# Patient Record
Sex: MALE | Race: WHITE
[De-identification: names, ages, dates, MRNs, and addresses within clinical notes are randomized per-mention and may not be internally consistent; named-entity substitution may affect disease eponyms.]

---

## 2020-05-15 ENCOUNTER — HOSPITAL ENCOUNTER (OUTPATIENT)
Dept: HOSPITAL 39 - GMA MATASK | Age: 55
End: 2020-05-15
Attending: FAMILY MEDICINE
Payer: COMMERCIAL

## 2020-05-15 DIAGNOSIS — I10: ICD-10-CM

## 2020-05-15 DIAGNOSIS — E03.9: Primary | ICD-10-CM

## 2020-11-13 ENCOUNTER — HOSPITAL ENCOUNTER (OUTPATIENT)
Dept: HOSPITAL 39 - GMA MATASK | Age: 55
End: 2020-11-13
Attending: FAMILY MEDICINE
Payer: COMMERCIAL

## 2020-11-13 DIAGNOSIS — E03.9: Primary | ICD-10-CM

## 2020-11-13 DIAGNOSIS — I10: ICD-10-CM

## 2020-12-07 ENCOUNTER — HOSPITAL ENCOUNTER (EMERGENCY)
Dept: HOSPITAL 39 - ER | Age: 55
Discharge: HOME | End: 2020-12-07
Payer: COMMERCIAL

## 2020-12-07 VITALS — DIASTOLIC BLOOD PRESSURE: 91 MMHG | SYSTOLIC BLOOD PRESSURE: 140 MMHG

## 2020-12-07 VITALS — OXYGEN SATURATION: 97 % | TEMPERATURE: 97.6 F

## 2020-12-07 DIAGNOSIS — I10: ICD-10-CM

## 2020-12-07 DIAGNOSIS — E07.9: ICD-10-CM

## 2020-12-07 DIAGNOSIS — Z87.891: ICD-10-CM

## 2020-12-07 DIAGNOSIS — M10.071: Primary | ICD-10-CM

## 2020-12-07 DIAGNOSIS — Z79.899: ICD-10-CM

## 2020-12-07 NOTE — RAD
EXAM DESCRIPTION: 



Foot,Right 3 Views



CLINICAL HISTORY: 



GREAT TOE SWELLING



COMPARISON: 



None.



TECHNIQUE: 



3 views right



FINDINGS: 



A plantar calcaneal spur is observed. No fracturing is detected.

No bone erosion or destruction is seen. Minimal degenerative

changes observed in the metatarsal phalangeal joint of the first

digit.



IMPRESSION: 



Mild degenerative changes are observed. No fracturing is

detected.



Electronically signed by:  Zachery Paz MD  12/7/2020 11:38 AM

[facility] Workstation: [id number]

## 2021-01-28 ENCOUNTER — HOSPITAL ENCOUNTER (OUTPATIENT)
Dept: HOSPITAL 39 - US | Age: 56
End: 2021-01-28
Attending: FAMILY MEDICINE
Payer: COMMERCIAL

## 2021-01-28 DIAGNOSIS — K42.9: Primary | ICD-10-CM

## 2021-01-28 NOTE — US
EXAM DESCRIPTION: 

Soft Tissue,Abdomen:  ULTRASOUND.



CLINICAL HISTORY: 

56 years Male UNSP ABD HERNIA WITH OBSTRUCTION, WITHOUT GANGRENE



COMPARISON: 

None Available.



TECHNIQUE: 

Transcutaneous scanning: Gray-scale and Doppler modes.



FINDINGS: 

Scanning in the region of the umbilicus. A mostly fatty hernia is

visualized in the adipose tissue layer anterior to the abdominal

wall. This hernia measures 1.8 x 1.9 x 1.6 cm. The defect is

approximately 1.6 x 1.4 cm posterior to the fatty layer. Minimal

anechoic fluid in the more anterior and superior aspect of the

hernia. No bowel with peristalsis. Not vascular. No fluid or

large calcifications in the surrounding adipose tissue.

Sonographer notes no movement in the hernia during the

examination.



IMPRESSION:  [place]-Umbilical hernia containing mostly fat and

minimal fluid. No bowel in the hernia. 



Electronically signed by:  Francesco Connolly MD  1/28/2021 1:18 PM [facility]

Workstation: [id number]

## 2021-02-11 ENCOUNTER — HOSPITAL ENCOUNTER (OUTPATIENT)
Dept: HOSPITAL 39 - AMB | Age: 56
Discharge: HOME | End: 2021-02-11
Attending: SPECIALIST
Payer: COMMERCIAL

## 2021-02-11 VITALS — DIASTOLIC BLOOD PRESSURE: 71 MMHG | SYSTOLIC BLOOD PRESSURE: 118 MMHG | OXYGEN SATURATION: 97 % | TEMPERATURE: 96.1 F

## 2021-02-11 DIAGNOSIS — E78.00: ICD-10-CM

## 2021-02-11 DIAGNOSIS — E03.9: ICD-10-CM

## 2021-02-11 DIAGNOSIS — K43.6: Primary | ICD-10-CM

## 2021-02-11 DIAGNOSIS — Z79.899: ICD-10-CM

## 2021-02-11 DIAGNOSIS — I10: ICD-10-CM

## 2021-02-11 PROCEDURE — 80048 BASIC METABOLIC PNL TOTAL CA: CPT

## 2021-02-11 PROCEDURE — 49568: CPT

## 2021-02-11 PROCEDURE — 85025 COMPLETE CBC W/AUTO DIFF WBC: CPT

## 2021-02-11 PROCEDURE — 00832 ANES HERNIA REPAIR VNT&INCAL: CPT

## 2021-02-11 PROCEDURE — 93005 ELECTROCARDIOGRAM TRACING: CPT

## 2021-02-11 PROCEDURE — 49561: CPT

## 2021-02-11 PROCEDURE — 36415 COLL VENOUS BLD VENIPUNCTURE: CPT

## 2021-02-11 NOTE — OP
DATE OF PROCEDURE:  02/11/21



PREOPERATIVE DIAGNOSIS: 

1.  Incarcerated ventral hernia.



POSTOPERATIVE DIAGNOSIS: 

1.  Incarcerated ventral hernia.



PROCEDURE: 

1.  Repair of incarcerated ventral hernia.



SURGEON:  Hemanth Talley MD.



ANESTHESIA:  General and local.



FINDINGS:  There was strangulated incarcerated preperitoneal fat, approximately 
2 cm and a little bit more fat below that.  The defect itself was under 2 cm.  



COMPLICATIONS:  None.



ESTIMATED BLOOD LOSS:  Minimal.



CONDITION:  Stable.



PLAN:  Discharge.  



INDICATION:  As stated.



PROCEDURE:  General anesthesia was induced.  He was prepped and draped in 
sterile fashion.  A supraumbilical incision was made over the palpable hernia 
contents.  Subcutaneous tissues were taken down.  That is when we encountered 
the incarcerated contents.  We dissected tissue right down to the fascia.  It 
was very tight.  It was all fat, slightly discolored, incarcerated.  It was 
ligated at its base.  There was a little more fatty tissue in that area just 
inferior that was dissected out and removed as well.  The defect itself was 
smaller than a fingertip.  We just closed it with interrupted 0 PDS sutures, 3 
in all, which was an excellent closure.  We put in a lot of local anesthesia.  
The wound was closed with interrupted 3-0 Vicryl and running 4-0 Monocryl.  
Dressing was applied.  He was awakened and taken to Recovery to be discharged.



#[id number]



cc:  Karel Aguilar MD

[facility]